# Patient Record
Sex: MALE | Race: WHITE | NOT HISPANIC OR LATINO
[De-identification: names, ages, dates, MRNs, and addresses within clinical notes are randomized per-mention and may not be internally consistent; named-entity substitution may affect disease eponyms.]

---

## 2017-02-23 ENCOUNTER — RX RENEWAL (OUTPATIENT)
Age: 52
End: 2017-02-23

## 2017-03-03 ENCOUNTER — MEDICATION RENEWAL (OUTPATIENT)
Age: 52
End: 2017-03-03

## 2017-03-07 ENCOUNTER — MEDICATION RENEWAL (OUTPATIENT)
Age: 52
End: 2017-03-07

## 2017-04-24 ENCOUNTER — RX RENEWAL (OUTPATIENT)
Age: 52
End: 2017-04-24

## 2017-06-26 ENCOUNTER — RX RENEWAL (OUTPATIENT)
Age: 52
End: 2017-06-26

## 2017-07-05 ENCOUNTER — RX RENEWAL (OUTPATIENT)
Age: 52
End: 2017-07-05

## 2017-07-11 ENCOUNTER — MEDICATION RENEWAL (OUTPATIENT)
Age: 52
End: 2017-07-11

## 2017-07-28 ENCOUNTER — APPOINTMENT (OUTPATIENT)
Dept: ENDOCRINOLOGY | Facility: CLINIC | Age: 52
End: 2017-07-28
Payer: COMMERCIAL

## 2017-07-28 VITALS
OXYGEN SATURATION: 98 % | HEIGHT: 70.5 IN | HEART RATE: 95 BPM | SYSTOLIC BLOOD PRESSURE: 122 MMHG | DIASTOLIC BLOOD PRESSURE: 88 MMHG

## 2017-07-28 PROCEDURE — 99215 OFFICE O/P EST HI 40 MIN: CPT

## 2017-11-06 ENCOUNTER — RX RENEWAL (OUTPATIENT)
Age: 52
End: 2017-11-06

## 2018-01-02 ENCOUNTER — RX RENEWAL (OUTPATIENT)
Age: 53
End: 2018-01-02

## 2018-01-30 ENCOUNTER — APPOINTMENT (OUTPATIENT)
Dept: ENDOCRINOLOGY | Facility: CLINIC | Age: 53
End: 2018-01-30
Payer: COMMERCIAL

## 2018-01-30 VITALS
HEIGHT: 70.5 IN | OXYGEN SATURATION: 97 % | SYSTOLIC BLOOD PRESSURE: 130 MMHG | RESPIRATION RATE: 17 BRPM | DIASTOLIC BLOOD PRESSURE: 80 MMHG | HEART RATE: 107 BPM

## 2018-01-30 LAB — GLUCOSE BLDC GLUCOMTR-MCNC: 236

## 2018-01-30 PROCEDURE — 99215 OFFICE O/P EST HI 40 MIN: CPT | Mod: 25

## 2018-01-30 PROCEDURE — 82962 GLUCOSE BLOOD TEST: CPT

## 2018-03-04 ENCOUNTER — RX RENEWAL (OUTPATIENT)
Age: 53
End: 2018-03-04

## 2018-03-04 ENCOUNTER — MEDICATION RENEWAL (OUTPATIENT)
Age: 53
End: 2018-03-04

## 2018-03-13 ENCOUNTER — MEDICATION RENEWAL (OUTPATIENT)
Age: 53
End: 2018-03-13

## 2018-04-30 ENCOUNTER — APPOINTMENT (OUTPATIENT)
Dept: ENDOCRINOLOGY | Facility: CLINIC | Age: 53
End: 2018-04-30
Payer: COMMERCIAL

## 2018-04-30 VITALS
SYSTOLIC BLOOD PRESSURE: 140 MMHG | HEART RATE: 92 BPM | OXYGEN SATURATION: 99 % | BODY MASS INDEX: 34.09 KG/M2 | WEIGHT: 241 LBS | DIASTOLIC BLOOD PRESSURE: 85 MMHG

## 2018-04-30 VITALS — DIASTOLIC BLOOD PRESSURE: 70 MMHG | SYSTOLIC BLOOD PRESSURE: 140 MMHG

## 2018-04-30 DIAGNOSIS — Z00.00 ENCOUNTER FOR GENERAL ADULT MEDICAL EXAMINATION W/OUT ABNORMAL FINDINGS: ICD-10-CM

## 2018-04-30 LAB
GLUCOSE BLDC GLUCOMTR-MCNC: 115
GLUCOSE BLDC GLUCOMTR-MCNC: 79
HBA1C MFR BLD HPLC: 7.5

## 2018-04-30 PROCEDURE — 99215 OFFICE O/P EST HI 40 MIN: CPT | Mod: 25

## 2018-04-30 PROCEDURE — 82962 GLUCOSE BLOOD TEST: CPT

## 2018-05-04 RX ORDER — INSULIN GLARGINE 100 [IU]/ML
100 INJECTION, SOLUTION SUBCUTANEOUS
Qty: 4 | Refills: 3 | Status: DISCONTINUED | COMMUNITY
Start: 2018-01-30 | End: 2018-05-04

## 2018-07-01 ENCOUNTER — RX RENEWAL (OUTPATIENT)
Age: 53
End: 2018-07-01

## 2018-09-17 ENCOUNTER — APPOINTMENT (OUTPATIENT)
Dept: ENDOCRINOLOGY | Facility: CLINIC | Age: 53
End: 2018-09-17

## 2018-12-03 ENCOUNTER — RX RENEWAL (OUTPATIENT)
Age: 53
End: 2018-12-03

## 2018-12-28 ENCOUNTER — RX RENEWAL (OUTPATIENT)
Age: 53
End: 2018-12-28

## 2019-02-12 ENCOUNTER — RX RENEWAL (OUTPATIENT)
Age: 54
End: 2019-02-12

## 2019-02-26 ENCOUNTER — RX RENEWAL (OUTPATIENT)
Age: 54
End: 2019-02-26

## 2019-05-24 ENCOUNTER — RX RENEWAL (OUTPATIENT)
Age: 54
End: 2019-05-24

## 2019-05-24 ENCOUNTER — MEDICATION RENEWAL (OUTPATIENT)
Age: 54
End: 2019-05-24

## 2019-05-28 ENCOUNTER — APPOINTMENT (OUTPATIENT)
Dept: ENDOCRINOLOGY | Facility: CLINIC | Age: 54
End: 2019-05-28
Payer: COMMERCIAL

## 2019-05-28 VITALS
WEIGHT: 280 LBS | SYSTOLIC BLOOD PRESSURE: 120 MMHG | BODY MASS INDEX: 39.64 KG/M2 | DIASTOLIC BLOOD PRESSURE: 80 MMHG | HEIGHT: 70.5 IN | OXYGEN SATURATION: 96 % | HEART RATE: 84 BPM

## 2019-05-28 PROCEDURE — 99215 OFFICE O/P EST HI 40 MIN: CPT

## 2019-05-28 RX ORDER — METOPROLOL SUCCINATE 50 MG/1
50 TABLET, EXTENDED RELEASE ORAL
Qty: 90 | Refills: 0 | Status: DISCONTINUED | COMMUNITY
Start: 2016-12-30 | End: 2019-05-28

## 2019-05-28 RX ORDER — TRAZODONE HYDROCHLORIDE 50 MG/1
50 TABLET ORAL
Qty: 14 | Refills: 0 | Status: ACTIVE | COMMUNITY
Start: 2019-01-10

## 2019-05-28 RX ORDER — NALTREXONE HYDROCHLORIDE AND BUPROPION HYDROCHLORIDE 8; 90 MG/1; MG/1
8-90 TABLET, EXTENDED RELEASE ORAL
Qty: 120 | Refills: 0 | Status: ACTIVE | COMMUNITY
Start: 2019-01-10

## 2019-05-28 RX ORDER — ZOLPIDEM TARTRATE 12.5 MG/1
12.5 TABLET, EXTENDED RELEASE ORAL
Qty: 30 | Refills: 0 | Status: ACTIVE | COMMUNITY
Start: 2018-10-19

## 2019-05-28 RX ORDER — NEBIVOLOL HYDROCHLORIDE 20 MG/1
20 TABLET ORAL DAILY
Refills: 0 | Status: ACTIVE | COMMUNITY
Start: 2019-05-28

## 2019-05-28 NOTE — DATA REVIEWED
[FreeTextEntry1] : Labs \par 7/24/17\par \par BUN/cr 21/0.73\par calcium 9.3\par microalb/cr 155\par chol 200 HDL 47 LDL 85 tri 340\par iron 70\par TSH 4.16 Free T4 1.4\par H/H 15.1/46.5\par Hba1c 9.5%\par 25 vitamin D 29\par \par 1/26/18\par \par BUN/cr 19/0.9\par calcium 10.1\par microalb/cr ratio 261\par chol 200 HDL 36  tri 388\par iron 75\par TSH 3.49 Free T4 1.2\par CBC normal\par HBa1c 9.7%\par 25 vitamin D 24\par \par 4/27/18\par glucose 162\par BUN/cr 23/0.77\par calcium 9.1\par microalb/cr ratio 404\par chol 178 HDL 40 LDL 97 tri 288\par iron 63\par TSH 3.5 Free T4 1.2\par H/H 13.8/41.7\par vitamin B12 242\par Hba1c 7.4%\par 25 vitamin D 33\par \par 5/24/19\par glucose 149\par BUN/cr 20/0.8\par calcium 9.2\par LFTs normal\par urine microalb/cr 483\par chol 201 HDL 44  tri 364\par iron 58\par ferritin 36\par TSH 3.12 Free T4 1.0\par H/H 13.4/40\par vitamin B12 233\par folate 7.9\par Hba1c 7.5%\par 25 vitamin d 29\par

## 2019-05-28 NOTE — HISTORY OF PRESENT ILLNESS
[___] : [unfilled] [FreeTextEntry1] : 53 y.o. male with h/o Type 2 DM, s/p gastric bypass,  HTN and hyperlipidemia here for follow up visit. Reports weight gain since starting insulin.  Checks FS 1 to 2 times per day.  No hypoglycemia. Taking Victoza 1.8 mg SQ daily, glipizide ER 10 mg daily, Metformin ER 1,000 mg BID and Tresiba 40 units daily. When took Lantus reported swelling and feeling irritable. Did get yeast infections with Invokana and has been off it. Eats small consistent meals and eating more fruits. Eating low carb and more protein. No foot complaints. UTD with optho (1/2019) and no retinopathy. No polyuria. No polydipsia. No SOB or CP. Anemia is stable and had iron infusion 2 years ago. No exercise now. Taking Contrave by PCP for weight loss. C/o fatigue. \par \par Takes vitamin D 100,000 Iu weekly and also takes bariatric vitamin.  [Hypoglycemia] : not hypoglycemic

## 2019-05-28 NOTE — PHYSICAL EXAM
[Alert] : alert [No Acute Distress] : no acute distress [Normal Sclera/Conjunctiva] : normal sclera/conjunctiva [EOMI] : extra ocular movement intact [No Neck Mass] : no neck mass was observed [Supple] : the neck was supple [No LAD] : no lymphadenopathy [No Accessory Muscle Use] : no accessory muscle use [Thyroid Not Enlarged] : the thyroid was not enlarged [Regular Rhythm] : with a regular rhythm [Normal S1, S2] : normal S1 and S2 [Pedal Pulses Normal] : the pedal pulses are present [Normal Bowel Sounds] : normal bowel sounds [Soft] : abdomen soft [Not Tender] : non-tender [Not Distended] : not distended [No Clubbing, Cyanosis] : no clubbing  or cyanosis of the fingernails [No Rash] : no rash [Left Foot Was Examined] : left foot ~C was examined [Right Foot Was Examined] : right foot ~C was examined [Normal] : normal [2+] : 2+ in the dorsalis pedis [Normal Affect] : the affect was normal [Normal Reflexes] : deep tendon reflexes were 2+ and symmetric [Normal Mood] : the mood was normal [Diminished Throughout Both Feet] : normal tactile sensation with monofilament testing throughout both feet [de-identified] : mild edema b/l

## 2019-05-28 NOTE — ASSESSMENT
[Carbohydrate Consistent Diet] : carbohydrate consistent diet [Importance of Diet and Exercise] : importance of diet and exercise to improve glycemic control, achieve weight loss and improve cardiovascular health [Long Term Vascular Complications] : long term vascular complications of diabetes [Incretin Mimetic Therapy] : Risks and benefits of incretin mimetic therapy were discussed with the patient including nauseau, pancreatitis and potential risk of medullary thyroid cancer [FreeTextEntry1] : 53 y.o. male with h/o Type 2 DM, HTN and hyperlipidemia.\par 1. Type 2 DM- Suboptimal control with Hba1c of 7.5%. Encouraged carbohydrate consistent diet and exercise. Will increase Tresiba to 48 units daily. Will continue Victoza 1.8 mg SQ daily. Will continue glipizide ER 10 mg daily and Metformin ER 2,000 mg daily. Encouraged SMBG and forwarding FS log for review.  Urine microalb/cr ratio is stable. Recommend vitamin B12 1,000 mcg daily. \par 2. HTN- BP is at goal and will continue Ramipril 10 mg BID and will continue Bystolic. \par 3. Hyperlipidemia- Triglyceride level is elevated. Encouraged low fat diet. Will increase Rosuvastatin to 20 mg daily. Will continue Fish oil 1,000 mg BID more consistently. \par 4. Vitamin D def- Near normal 25 vitamin d and will continue supplement.\par \par Follow up in 3 months\par

## 2019-05-30 ENCOUNTER — RX RENEWAL (OUTPATIENT)
Age: 54
End: 2019-05-30

## 2019-05-30 RX ORDER — DULAGLUTIDE 0.75 MG/.5ML
0.75 INJECTION, SOLUTION SUBCUTANEOUS
Qty: 1 | Refills: 0 | Status: DISCONTINUED | COMMUNITY
Start: 2019-05-30 | End: 2019-05-30

## 2019-05-30 RX ORDER — LIRAGLUTIDE 6 MG/ML
18 INJECTION SUBCUTANEOUS
Qty: 3 | Refills: 1 | Status: COMPLETED | COMMUNITY
Start: 2017-07-28 | End: 2019-05-30

## 2019-06-26 ENCOUNTER — RX RENEWAL (OUTPATIENT)
Age: 54
End: 2019-06-26

## 2019-08-04 ENCOUNTER — RX RENEWAL (OUTPATIENT)
Age: 54
End: 2019-08-04

## 2019-08-12 ENCOUNTER — MEDICATION RENEWAL (OUTPATIENT)
Age: 54
End: 2019-08-12

## 2019-10-11 ENCOUNTER — APPOINTMENT (OUTPATIENT)
Dept: ENDOCRINOLOGY | Facility: CLINIC | Age: 54
End: 2019-10-11

## 2019-12-23 ENCOUNTER — RX RENEWAL (OUTPATIENT)
Age: 54
End: 2019-12-23

## 2020-01-09 ENCOUNTER — RX RENEWAL (OUTPATIENT)
Age: 55
End: 2020-01-09

## 2020-02-21 ENCOUNTER — RX RENEWAL (OUTPATIENT)
Age: 55
End: 2020-02-21

## 2020-04-07 ENCOUNTER — RX RENEWAL (OUTPATIENT)
Age: 55
End: 2020-04-07

## 2020-04-13 ENCOUNTER — RX RENEWAL (OUTPATIENT)
Age: 55
End: 2020-04-13

## 2020-05-04 ENCOUNTER — RX RENEWAL (OUTPATIENT)
Age: 55
End: 2020-05-04

## 2020-07-06 ENCOUNTER — RX RENEWAL (OUTPATIENT)
Age: 55
End: 2020-07-06

## 2020-09-17 ENCOUNTER — RX RENEWAL (OUTPATIENT)
Age: 55
End: 2020-09-17

## 2020-09-28 ENCOUNTER — RX RENEWAL (OUTPATIENT)
Age: 55
End: 2020-09-28

## 2020-12-21 ENCOUNTER — RX RENEWAL (OUTPATIENT)
Age: 55
End: 2020-12-21

## 2021-01-12 ENCOUNTER — TRANSCRIPTION ENCOUNTER (OUTPATIENT)
Age: 56
End: 2021-01-12

## 2021-02-01 ENCOUNTER — RX RENEWAL (OUTPATIENT)
Age: 56
End: 2021-02-01

## 2021-02-09 ENCOUNTER — RX RENEWAL (OUTPATIENT)
Age: 56
End: 2021-02-09

## 2021-02-09 ENCOUNTER — TRANSCRIPTION ENCOUNTER (OUTPATIENT)
Age: 56
End: 2021-02-09

## 2021-03-15 ENCOUNTER — RX RENEWAL (OUTPATIENT)
Age: 56
End: 2021-03-15

## 2021-03-17 ENCOUNTER — RX RENEWAL (OUTPATIENT)
Age: 56
End: 2021-03-17

## 2021-04-20 ENCOUNTER — RX RENEWAL (OUTPATIENT)
Age: 56
End: 2021-04-20

## 2021-05-10 ENCOUNTER — RX RENEWAL (OUTPATIENT)
Age: 56
End: 2021-05-10

## 2021-05-17 ENCOUNTER — RX RENEWAL (OUTPATIENT)
Age: 56
End: 2021-05-17

## 2021-06-04 ENCOUNTER — APPOINTMENT (OUTPATIENT)
Dept: ENDOCRINOLOGY | Facility: CLINIC | Age: 56
End: 2021-06-04
Payer: COMMERCIAL

## 2021-06-04 VITALS
SYSTOLIC BLOOD PRESSURE: 130 MMHG | TEMPERATURE: 98.3 F | DIASTOLIC BLOOD PRESSURE: 70 MMHG | BODY MASS INDEX: 40.06 KG/M2 | HEIGHT: 70.5 IN | HEART RATE: 70 BPM | OXYGEN SATURATION: 98 % | WEIGHT: 283 LBS

## 2021-06-04 PROCEDURE — 99214 OFFICE O/P EST MOD 30 MIN: CPT

## 2021-06-05 RX ORDER — DULAGLUTIDE 1.5 MG/.5ML
1.5 INJECTION, SOLUTION SUBCUTANEOUS
Qty: 6 | Refills: 0 | Status: DISCONTINUED | COMMUNITY
Start: 2019-05-30 | End: 2021-06-05

## 2021-06-05 RX ORDER — GLIPIZIDE 10 MG/1
10 TABLET ORAL
Qty: 10 | Refills: 0 | Status: DISCONTINUED | COMMUNITY
Start: 2021-02-15

## 2021-06-05 NOTE — ASSESSMENT
[Carbohydrate Consistent Diet] : carbohydrate consistent diet [Long Term Vascular Complications] : long term vascular complications of diabetes [Importance of Diet and Exercise] : importance of diet and exercise to improve glycemic control, achieve weight loss and improve cardiovascular health [Incretin Mimetic Therapy] : Risks and benefits of incretin mimetic therapy were discussed with the patient including nauseau, pancreatitis and potential risk of medullary thyroid cancer [FreeTextEntry1] : 55 y.o. male with h/o Type 2 DM, HTN and hyperlipidemia.\par \par 1. Type 2 DM- Good control with Hba1c of 6.5%  in May 2021. Encouraged carbohydrate consistent diet and exercise. Will decrease Tresiba to 30 units daily. Will increase Trulicity to 3 mg SQ weekly . Will continue glipizide ER 10 mg daily and Metformin ER 2,000 mg daily. Encouraged SMBG and forwarding FS log for review.  Urine microalb/cr ratio is stable in May 2021. Will continue vitamin B12 1,000 mcg daily. \par \par 2. HTN- BP is at goal and will continue Ramipril 10 mg BID and will continue Bystolic. \par \par 3. Hyperlipidemia- Lipids are at goal. Encouraged low fat diet. Will continue Rosuvastatin 20 mg daily. Will continue Fish oil 1,000 mg BID. \par \par 4. Vitamin D def- Normal 25 vitamin D and will continue supplement.\par \par Follow up in 3 months\par Follow up with hematology given low ferritin and iron\par  [Hypoglycemia Management] : hypoglycemia management

## 2021-06-05 NOTE — DATA REVIEWED
[FreeTextEntry1] : Labs \par 7/24/17\par \par BUN/cr 21/0.73\par calcium 9.3\par microalb/cr 155\par chol 200 HDL 47 LDL 85 tri 340\par iron 70\par TSH 4.16 Free T4 1.4\par H/H 15.1/46.5\par Hba1c 9.5%\par 25 vitamin D 29\par \par 1/26/18\par \par BUN/cr 19/0.9\par calcium 10.1\par microalb/cr ratio 261\par chol 200 HDL 36  tri 388\par iron 75\par TSH 3.49 Free T4 1.2\par CBC normal\par HBa1c 9.7%\par 25 vitamin D 24\par \par 4/27/18\par glucose 162\par BUN/cr 23/0.77\par calcium 9.1\par microalb/cr ratio 404\par chol 178 HDL 40 LDL 97 tri 288\par iron 63\par TSH 3.5 Free T4 1.2\par H/H 13.8/41.7\par vitamin B12 242\par Hba1c 7.4%\par 25 vitamin D 33\par \par 5/24/19\par glucose 149\par BUN/cr 20/0.8\par calcium 9.2\par LFTs normal\par urine microalb/cr 483\par chol 201 HDL 44  tri 364\par iron 58\par ferritin 36\par TSH 3.12 Free T4 1.0\par H/H 13.4/40\par vitamin B12 233\par folate 7.9\par Hba1c 7.5%\par 25 vitamin d 29\par \par 5/28/21\par urine microalb/cr ratio 253\par chol 158 HDL 48 LDL 85 tri 148\par glucose 95\par BUN/cr 17/0.85\par Hba1c 6.5%\par TSH 2.70\par H/H 13.1/40.9\par vitamin B12 353\par 25 vitamin D 40\par \par

## 2021-06-05 NOTE — PHYSICAL EXAM
[Alert] : alert [No Acute Distress] : no acute distress [Normal Sclera/Conjunctiva] : normal sclera/conjunctiva [EOMI] : extra ocular movement intact [No LAD] : no lymphadenopathy [Thyroid Not Enlarged] : the thyroid was not enlarged [No Respiratory Distress] : no respiratory distress [Clear to Auscultation] : lungs were clear to auscultation bilaterally [Normal S1, S2] : normal S1 and S2 [Regular Rhythm] : with a regular rhythm [Normal Bowel Sounds] : normal bowel sounds [Not Tender] : non-tender [Not Distended] : not distended [Soft] : abdomen soft [Normal Anterior Cervical Nodes] : no anterior cervical lymphadenopathy [No Clubbing, Cyanosis] : no clubbing  or cyanosis of the fingernails [No Rash] : no rash [Right Foot Was Examined] : right foot ~C was examined [Left Foot Was Examined] : left foot ~C was examined [Normal] : normal [2+] : 2+ in the dorsalis pedis [Diminished Throughout Both Feet] : diminished tactile sensation with monofilament testing throughout both feet [Normal Reflexes] : deep tendon reflexes were 2+ and symmetric [Normal Affect] : the affect was normal [Normal Mood] : the mood was normal [de-identified] : mild edema b/l [FreeTextEntry1] : callus [FreeTextEntry5] : callus

## 2021-06-05 NOTE — HISTORY OF PRESENT ILLNESS
[___] : [unfilled] [FreeTextEntry1] : 55 y.o. male with h/o Type 2 DM, s/p gastric bypass,  HTN and hyperlipidemia here for follow up visit. Suspected COVID-19 infection on 12/31/20 but did get COVID-19 vaccine Moderna X 2. Reports weight gain since starting insulin but stable since last year.  Checks FS 1 to 2 times per day.  No hypoglycemia. Taking Trulicity 1.5 mg SQ weekly, glipizide ER 10 mg daily, Metformin ER 1,000 mg BID and Tresiba 40 units daily. When took Lantus reported swelling and feeling irritable. Did get yeast infections with Invokana and has been off it. Eats small consistent meals and eating more fruits. Eating low carb and more protein. Saw podiatry. No foot complaints. UTD with optho (4/2021) and does have retinopathy. Had Lasik eye surgery. No polyuria. No polydipsia. No SOB or CP. Anemia is stable and had iron infusion 4 years ago. Saw hematology pre pandemic. No exercise now. Taking Contrave by PCP for weight loss. C/o fatigue. Does walking now. \par \par Takes vitamin D 80,000 Iu weekly and also takes MVI and Fish oil 2 BID.  [Hypoglycemia] : not hypoglycemic

## 2021-06-05 NOTE — REVIEW OF SYSTEMS
[All other systems negative] : All other systems negative [Fatigue] : fatigue [Recent Weight Gain (___ Lbs)] : no recent weight gain [Recent Weight Loss (___ Lbs)] : no recent weight loss

## 2021-06-07 ENCOUNTER — RX RENEWAL (OUTPATIENT)
Age: 56
End: 2021-06-07

## 2021-07-22 ENCOUNTER — RX RENEWAL (OUTPATIENT)
Age: 56
End: 2021-07-22

## 2021-08-16 ENCOUNTER — RX RENEWAL (OUTPATIENT)
Age: 56
End: 2021-08-16

## 2021-10-11 ENCOUNTER — APPOINTMENT (OUTPATIENT)
Dept: ENDOCRINOLOGY | Facility: CLINIC | Age: 56
End: 2021-10-11
Payer: COMMERCIAL

## 2021-10-11 VITALS
BODY MASS INDEX: 40.91 KG/M2 | WEIGHT: 289 LBS | SYSTOLIC BLOOD PRESSURE: 152 MMHG | DIASTOLIC BLOOD PRESSURE: 88 MMHG | HEIGHT: 70.5 IN | HEART RATE: 76 BPM | OXYGEN SATURATION: 96 % | RESPIRATION RATE: 18 BRPM

## 2021-10-11 VITALS — SYSTOLIC BLOOD PRESSURE: 140 MMHG | DIASTOLIC BLOOD PRESSURE: 70 MMHG

## 2021-10-11 PROCEDURE — 99214 OFFICE O/P EST MOD 30 MIN: CPT

## 2021-10-11 RX ORDER — PEN NEEDLE, DIABETIC 29 G X1/2"
31G X 5 MM NEEDLE, DISPOSABLE MISCELLANEOUS
Qty: 1 | Refills: 3 | Status: DISCONTINUED | COMMUNITY
Start: 2017-07-28 | End: 2021-10-11

## 2021-10-11 RX ORDER — PEN NEEDLE, DIABETIC 32GX 5/32"
32G X 4 MM NEEDLE, DISPOSABLE MISCELLANEOUS
Qty: 100 | Refills: 3 | Status: ACTIVE | COMMUNITY
Start: 2021-10-11 | End: 1900-01-01

## 2021-10-11 NOTE — ASSESSMENT
[Carbohydrate Consistent Diet] : carbohydrate consistent diet [Long Term Vascular Complications] : long term vascular complications of diabetes [Importance of Diet and Exercise] : importance of diet and exercise to improve glycemic control, achieve weight loss and improve cardiovascular health [Incretin Mimetic Therapy] : Risks and benefits of incretin mimetic therapy were discussed with the patient including nauseau, pancreatitis and potential risk of medullary thyroid cancer [FreeTextEntry1] : 56 y.o. male with h/o Type 2 DM, HTN and hyperlipidemia.\par \par 1. Type 2 DM- Good control with Hba1c of 6.4% this month. Encouraged carbohydrate consistent diet and exercise. Will decrease Tresiba to 30 units daily. Will increase Trulicity to 4.5 mg SQ weekly . Will continue glipizide ER 10 mg daily and Metformin ER 2,000 mg daily. Encouraged SMBG and forwarding FS log for review.  Urine microalb/cr ratio is stable in October 2021. Will continue vitamin B12 1,000 mcg daily. \par \par 2. HTN- BP is above goal and will continue Ramipril 10 mg BID and will continue Bystolic for now. Recommend follow up with cardiology.  \par \par 3. Hyperlipidemia- LDL is at goal. Encouraged low fat diet. Will continue Rosuvastatin 20 mg daily. Will continue Fish oil 1,000 mg BID for hypertriglyceridemia. \par \par 4. Vitamin D def- Normal 25 vitamin D and will continue supplement.\par \par Follow up in 3 months\par Follows with hematology for iron deficiency\par Follow up with PCP for joint symptoms\par

## 2021-10-11 NOTE — HISTORY OF PRESENT ILLNESS
[___] : [unfilled] [FreeTextEntry1] : 56 y.o. male with h/o Type 2 DM, s/p gastric bypass,  HTN and hyperlipidemia here for follow up visit. Suspected COVID-19 infection on 12/31/20 and did get COVID-19 vaccine Moderna X 2. Reports weight gain since starting insulin but stable since last year.  Checks FS 1 to 2 times per day.  No hypoglycemia. Taking Trulicity 3 mg SQ weekly, glipizide ER 10 mg daily, Metformin ER 1,000 mg BID and Tresiba 44 units daily. When took Lantus reported swelling and feeling irritable. Did get yeast infections with Invokana and has been off it. Eats small consistent meals and eating more fruits. Eating low carb and more protein. Reports late night snacking is a problem. Saw podiatry. No foot complaints. UTD with optho (4/2021) and does have retinopathy. Had Lasik eye surgery. No polyuria. No polydipsia. No SOB or CP. Anemia is stable. Saw hematology and received iron infusion and vitamin B12 injection in July 2021. No exercise now but does walking. Taking Contrave by PCP for weight loss. C/o fatigue but sleep is a problem. Reports diffuse joint stiffness which comes on and off. \par \par Takes vitamin D 80,000 Iu weekly and also takes MVI and Fish oil 2 BID.  [Hypoglycemia] : not hypoglycemic

## 2021-10-11 NOTE — REVIEW OF SYSTEMS
[Fatigue] : fatigue [Recent Weight Gain (___ Lbs)] : recent [unfilled] ~Ulb weight gain [Joint Stiffness] : joint stiffness [All other systems negative] : All other systems negative [Recent Weight Loss (___ Lbs)] : no recent weight loss

## 2021-10-11 NOTE — DATA REVIEWED
[FreeTextEntry1] : Labs \par 7/24/17\par \par BUN/cr 21/0.73\par calcium 9.3\par microalb/cr 155\par chol 200 HDL 47 LDL 85 tri 340\par iron 70\par TSH 4.16 Free T4 1.4\par H/H 15.1/46.5\par Hba1c 9.5%\par 25 vitamin D 29\par \par 1/26/18\par \par BUN/cr 19/0.9\par calcium 10.1\par microalb/cr ratio 261\par chol 200 HDL 36  tri 388\par iron 75\par TSH 3.49 Free T4 1.2\par CBC normal\par HBa1c 9.7%\par 25 vitamin D 24\par \par 4/27/18\par glucose 162\par BUN/cr 23/0.77\par calcium 9.1\par microalb/cr ratio 404\par chol 178 HDL 40 LDL 97 tri 288\par iron 63\par TSH 3.5 Free T4 1.2\par H/H 13.8/41.7\par vitamin B12 242\par Hba1c 7.4%\par 25 vitamin D 33\par \par 5/24/19\par glucose 149\par BUN/cr 20/0.8\par calcium 9.2\par LFTs normal\par urine microalb/cr 483\par chol 201 HDL 44  tri 364\par iron 58\par ferritin 36\par TSH 3.12 Free T4 1.0\par H/H 13.4/40\par vitamin B12 233\par folate 7.9\par Hba1c 7.5%\par 25 vitamin d 29\par \par 5/28/21\par urine microalb/cr ratio 253\par chol 158 HDL 48 LDL 85 tri 148\par glucose 95\par BUN/cr 17/0.85\par Hba1c 6.5%\par TSH 2.70\par H/H 13.1/40.9\par vitamin B12 353\par 25 vitamin D 40\par \par 10/6/21\par iron 79\par alb/cr ratio 220\par chol 160 HDL 41 LDL 74 tri 376\par glucose 93\par BUN/cr 20/0.72\par HBa1c 6.4%\par TSH 3.15\par H/H 14.2/42.7\par vitamin B12 >2,000 \par 25 vitamin D 56\par \par

## 2021-10-11 NOTE — PHYSICAL EXAM
[Alert] : alert [No Acute Distress] : no acute distress [Normal Sclera/Conjunctiva] : normal sclera/conjunctiva [EOMI] : extra ocular movement intact [No LAD] : no lymphadenopathy [Thyroid Not Enlarged] : the thyroid was not enlarged [No Respiratory Distress] : no respiratory distress [Clear to Auscultation] : lungs were clear to auscultation bilaterally [Normal S1, S2] : normal S1 and S2 [Regular Rhythm] : with a regular rhythm [Normal Bowel Sounds] : normal bowel sounds [Not Tender] : non-tender [Not Distended] : not distended [Soft] : abdomen soft [Normal Anterior Cervical Nodes] : no anterior cervical lymphadenopathy [No Clubbing, Cyanosis] : no clubbing  or cyanosis of the fingernails [No Rash] : no rash [Normal] : normal [2+] : 2+ in the dorsalis pedis [Diminished Throughout Both Feet] : diminished tactile sensation with monofilament testing throughout both feet [Normal Reflexes] : deep tendon reflexes were 2+ and symmetric [Normal Affect] : the affect was normal [Normal Mood] : the mood was normal [Right foot was examined, including] : right foot ~C was examined, including visual inspection with sensory and pulse exams [Left foot was examined, including] : left foot ~C was examined, including visual inspection with sensory and pulse exams [de-identified] : mild edema b/l [FreeTextEntry1] : callus [FreeTextEntry5] : callus

## 2021-10-25 ENCOUNTER — NON-APPOINTMENT (OUTPATIENT)
Age: 56
End: 2021-10-25

## 2021-10-25 ENCOUNTER — APPOINTMENT (OUTPATIENT)
Dept: CARDIOLOGY | Facility: CLINIC | Age: 56
End: 2021-10-25
Payer: COMMERCIAL

## 2021-10-25 VITALS
DIASTOLIC BLOOD PRESSURE: 74 MMHG | HEIGHT: 70.5 IN | HEART RATE: 78 BPM | RESPIRATION RATE: 96 BRPM | BODY MASS INDEX: 40.77 KG/M2 | TEMPERATURE: 98.2 F | WEIGHT: 288 LBS | SYSTOLIC BLOOD PRESSURE: 145 MMHG

## 2021-10-25 DIAGNOSIS — E11.65 TYPE 2 DIABETES MELLITUS WITH HYPERGLYCEMIA: ICD-10-CM

## 2021-10-25 PROCEDURE — 99204 OFFICE O/P NEW MOD 45 MIN: CPT

## 2021-10-25 PROCEDURE — 93000 ELECTROCARDIOGRAM COMPLETE: CPT

## 2021-10-25 NOTE — DISCUSSION/SUMMARY
[FreeTextEntry1] : 56M with DM HTN HLD anemia presents to establish cardiovascular care\par \par \par Preventive care\par -pt without symptoms of CV disease but with multiple risk factors including age, htn hld, dm elevated BMI\par -will check tte to r/o structural heart disease and will check calcium score for further risk stratification\par \par \par HLD\par -TG elevated\par -d/w pt RE: dietary modification vs increasing statin therapy\par -will try dietary modifications for now\par \par HTN\par -mildly elevated here today\par -states at home bp log much better\par -will check tte to r/o LVH, structural heart disease to see if we should pursue more aggressive bp mgmt\par \par f/u 6 months or sooner if needed

## 2021-10-25 NOTE — PHYSICAL EXAM
[Well Developed] : well developed [Well Nourished] : well nourished [No Acute Distress] : no acute distress [Normal Venous Pressure] : normal venous pressure [Normal S1, S2] : normal S1, S2 [No Murmur] : no murmur [Clear Lung Fields] : clear lung fields [Good Air Entry] : good air entry [No Respiratory Distress] : no respiratory distress  [Soft] : abdomen soft [Non Tender] : non-tender [No Masses/organomegaly] : no masses/organomegaly [No Edema] : no edema [No Rash] : no rash [Moves all extremities] : moves all extremities [No Focal Deficits] : no focal deficits [Alert and Oriented] : alert and oriented

## 2021-10-25 NOTE — HISTORY OF PRESENT ILLNESS
[FreeTextEntry1] : 56M with DM HTN HLD anemia presents to establish cardiovascular care\par Sent in by: endo: Dr Carr. \par PMD: Dr [place] Xuperfecto  [phone] \par \par pt presents for overall evaluation. \par \par pt states he is overall feeling well. denies cp or sob at rest of on exertion. able to walk several blocks without issues. pt denies palpitations, dizziness, syncope, diaphoresis. pt denies LE edema, orthopnea. \par \par \par pt had covid 12/2020, not hospitalized. \par  \par  \par Exercise: walking 10,000 per day.\par Diet: none\par \par Prior cardiac workup: ten years ago, normal per pt. \par Recent labs: 10/21: tot chol 160 tg 376 hdl 41 ldl 74 Cr .72 gfr 102 a1c 6.4\par \par pt checks bp at home occasionally, usually gets 120-130s systolic. \par  \par EKG: SR 74\par \par  \par Med hx: DM HTN HLD iron deficient anemia (2/2 gastric bipass) on iron infusions q2 years. \par Sx hx: bariatric surgery, tonsils. \par Fam hx: none\par Social hx: lives in [place] with wife and 2 kids (twins 13 yo). 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 no tob. minimal etoh. no drugs. \par Meds: nebivolol 20 ramipril 10 crestor glipizide metformin trulicity trazodone prn ambien prn insulin\par Allergies: nkda\par \par

## 2021-11-16 ENCOUNTER — APPOINTMENT (OUTPATIENT)
Dept: CARDIOLOGY | Facility: CLINIC | Age: 56
End: 2021-11-16

## 2021-12-14 ENCOUNTER — RX RENEWAL (OUTPATIENT)
Age: 56
End: 2021-12-14

## 2022-01-18 ENCOUNTER — APPOINTMENT (OUTPATIENT)
Dept: ENDOCRINOLOGY | Facility: CLINIC | Age: 57
End: 2022-01-18

## 2022-02-22 ENCOUNTER — RX RENEWAL (OUTPATIENT)
Age: 57
End: 2022-02-22

## 2022-03-21 ENCOUNTER — APPOINTMENT (OUTPATIENT)
Dept: CARDIOLOGY | Facility: CLINIC | Age: 57
End: 2022-03-21
Payer: COMMERCIAL

## 2022-03-21 PROCEDURE — 93306 TTE W/DOPPLER COMPLETE: CPT

## 2022-04-01 ENCOUNTER — APPOINTMENT (OUTPATIENT)
Dept: CT IMAGING | Facility: CLINIC | Age: 57
End: 2022-04-01
Payer: SELF-PAY

## 2022-04-01 ENCOUNTER — OUTPATIENT (OUTPATIENT)
Dept: OUTPATIENT SERVICES | Facility: HOSPITAL | Age: 57
LOS: 1 days | End: 2022-04-01
Payer: SELF-PAY

## 2022-04-01 DIAGNOSIS — E11.9 TYPE 2 DIABETES MELLITUS WITHOUT COMPLICATIONS: ICD-10-CM

## 2022-04-01 DIAGNOSIS — E11.65 TYPE 2 DIABETES MELLITUS WITH HYPERGLYCEMIA: ICD-10-CM

## 2022-04-01 PROCEDURE — 75571 CT HRT W/O DYE W/CA TEST: CPT | Mod: 26

## 2022-04-01 PROCEDURE — 75571 CT HRT W/O DYE W/CA TEST: CPT

## 2022-08-04 ENCOUNTER — RX RENEWAL (OUTPATIENT)
Age: 57
End: 2022-08-04

## 2022-08-10 ENCOUNTER — RX RENEWAL (OUTPATIENT)
Age: 57
End: 2022-08-10

## 2022-09-09 ENCOUNTER — APPOINTMENT (OUTPATIENT)
Dept: DERMATOLOGY | Facility: CLINIC | Age: 57
End: 2022-09-09

## 2022-10-17 ENCOUNTER — RX RENEWAL (OUTPATIENT)
Age: 57
End: 2022-10-17

## 2022-10-27 NOTE — HISTORY REVIEWED
[History reviewed] : History reviewed. [Medications and Allergies reviewed] : Medications and allergies reviewed. Spiral Flap Text: The defect edges were debeveled with a #15 scalpel blade.  Given the location of the defect, shape of the defect and the proximity to free margins a spiral flap was deemed most appropriate.  Using a sterile surgical marker, an appropriate rotation flap was drawn incorporating the defect and placing the expected incisions within the relaxed skin tension lines where possible. The area thus outlined was incised deep to adipose tissue with a #15 scalpel blade.  The skin margins were undermined to an appropriate distance in all directions utilizing iris scissors.

## 2022-11-01 ENCOUNTER — APPOINTMENT (OUTPATIENT)
Dept: ENDOCRINOLOGY | Facility: CLINIC | Age: 57
End: 2022-11-01

## 2022-12-07 ENCOUNTER — RX RENEWAL (OUTPATIENT)
Age: 57
End: 2022-12-07

## 2023-01-16 ENCOUNTER — RX RENEWAL (OUTPATIENT)
Age: 58
End: 2023-01-16

## 2023-03-07 ENCOUNTER — RX RENEWAL (OUTPATIENT)
Age: 58
End: 2023-03-07

## 2023-06-05 ENCOUNTER — RX RENEWAL (OUTPATIENT)
Age: 58
End: 2023-06-05

## 2023-08-07 ENCOUNTER — RX RENEWAL (OUTPATIENT)
Age: 58
End: 2023-08-07

## 2023-08-21 ENCOUNTER — RX RENEWAL (OUTPATIENT)
Age: 58
End: 2023-08-21

## 2023-09-04 ENCOUNTER — RX RENEWAL (OUTPATIENT)
Age: 58
End: 2023-09-04

## 2023-09-04 RX ORDER — INSULIN DEGLUDEC INJECTION 200 U/ML
200 INJECTION, SOLUTION SUBCUTANEOUS
Qty: 27 | Refills: 3 | Status: ACTIVE | COMMUNITY
Start: 2018-05-04 | End: 1900-01-01

## 2023-10-03 ENCOUNTER — RX RENEWAL (OUTPATIENT)
Age: 58
End: 2023-10-03

## 2023-10-24 ENCOUNTER — RX RENEWAL (OUTPATIENT)
Age: 58
End: 2023-10-24

## 2023-11-03 ENCOUNTER — RX RENEWAL (OUTPATIENT)
Age: 58
End: 2023-11-03

## 2023-11-06 ENCOUNTER — APPOINTMENT (OUTPATIENT)
Dept: ENDOCRINOLOGY | Facility: CLINIC | Age: 58
End: 2023-11-06
Payer: COMMERCIAL

## 2023-11-06 VITALS
TEMPERATURE: 98.3 F | BODY MASS INDEX: 40.35 KG/M2 | WEIGHT: 285 LBS | HEART RATE: 75 BPM | HEIGHT: 70.5 IN | DIASTOLIC BLOOD PRESSURE: 76 MMHG | SYSTOLIC BLOOD PRESSURE: 136 MMHG | OXYGEN SATURATION: 97 %

## 2023-11-06 PROCEDURE — 99215 OFFICE O/P EST HI 40 MIN: CPT | Mod: 25

## 2023-11-06 RX ORDER — HYDROCHLOROTHIAZIDE 25 MG/1
25 TABLET ORAL DAILY
Qty: 90 | Refills: 3 | Status: ACTIVE | COMMUNITY
Start: 2023-11-06

## 2023-11-20 ENCOUNTER — RX RENEWAL (OUTPATIENT)
Age: 58
End: 2023-11-20

## 2024-01-01 ENCOUNTER — RX RENEWAL (OUTPATIENT)
Age: 59
End: 2024-01-01

## 2024-03-05 ENCOUNTER — APPOINTMENT (OUTPATIENT)
Dept: ENDOCRINOLOGY | Facility: CLINIC | Age: 59
End: 2024-03-05
Payer: COMMERCIAL

## 2024-03-05 VITALS
HEART RATE: 83 BPM | OXYGEN SATURATION: 98 % | WEIGHT: 284 LBS | SYSTOLIC BLOOD PRESSURE: 132 MMHG | HEIGHT: 70.5 IN | BODY MASS INDEX: 40.2 KG/M2 | DIASTOLIC BLOOD PRESSURE: 84 MMHG

## 2024-03-05 DIAGNOSIS — E78.5 HYPERLIPIDEMIA, UNSPECIFIED: ICD-10-CM

## 2024-03-05 DIAGNOSIS — I10 ESSENTIAL (PRIMARY) HYPERTENSION: ICD-10-CM

## 2024-03-05 DIAGNOSIS — E55.9 VITAMIN D DEFICIENCY, UNSPECIFIED: ICD-10-CM

## 2024-03-05 DIAGNOSIS — E11.9 TYPE 2 DIABETES MELLITUS W/OUT COMPLICATIONS: ICD-10-CM

## 2024-03-05 DIAGNOSIS — E66.01 MORBID (SEVERE) OBESITY DUE TO EXCESS CALORIES: ICD-10-CM

## 2024-03-05 DIAGNOSIS — E53.8 DEFICIENCY OF OTHER SPECIFIED B GROUP VITAMINS: ICD-10-CM

## 2024-03-05 PROCEDURE — 99214 OFFICE O/P EST MOD 30 MIN: CPT

## 2024-03-05 RX ORDER — DULAGLUTIDE 4.5 MG/.5ML
4.5 INJECTION, SOLUTION SUBCUTANEOUS
Qty: 6 | Refills: 0 | Status: DISCONTINUED | COMMUNITY
Start: 2021-06-05 | End: 2024-03-05

## 2024-03-05 NOTE — ASSESSMENT
[Long Term Vascular Complications] : long term vascular complications of diabetes [Carbohydrate Consistent Diet] : carbohydrate consistent diet [Incretin Mimetic Therapy] : Risks and benefits of incretin mimetic therapy were discussed with the patient including nauseau, pancreatitis and potential risk of medullary thyroid cancer [Importance of Diet and Exercise] : importance of diet and exercise to improve glycemic control, achieve weight loss and improve cardiovascular health [FreeTextEntry1] : 58 y.o. male with h/o Type 2 DM with morbid obesity, HTN, hyperlipidemia and vitamin D def.  1. Type 2 DM with morbid obesity- Good control with Hba1c of 6% in March 2024. Encouraged a carbohydrate consistent diet and exercise. Will continue Tresiba 58 units daily. Will continue Mounjaro 15 mg SQ weekly. Reviewed risks and benefits of GLP-1 agonist including GI side effects, pancreatitis and MTC. Will discontinue glipizide ER 10 mg daily for now. Will continue Metformin ER 2,000 mg daily. Encouraged SMBG and forwarding FS log for review. Urine alb/cr ratio is stable in October 2023 and will recheck today. Recommend vitamin B12 1,000 mcg daily.  2. HTN- BP is at goal and will continue Ramipril 10 mg BID and Bystolic 20 mg daily for now. Recommend follow up with cardiology. Will use HCTZ prn for LE edema.   3. Hyperlipidemia- Will check lipids. Encouraged low fat diet. Will continue Rosuvastatin 20 mg daily. Will continue Fish oil 1,000 mg BID for hypertriglyceridemia.  4. Vitamin D def- Normal 25 vitamin D and will continue supplement.    Follow up in 4 to 6 months.  .

## 2024-03-05 NOTE — PHYSICAL EXAM
[Alert] : alert [No Acute Distress] : no acute distress [Normal Sclera/Conjunctiva] : normal sclera/conjunctiva [EOMI] : extra ocular movement intact [Thyroid Not Enlarged] : the thyroid was not enlarged [No LAD] : no lymphadenopathy [No Respiratory Distress] : no respiratory distress [Clear to Auscultation] : lungs were clear to auscultation bilaterally [Normal S1, S2] : normal S1 and S2 [Regular Rhythm] : with a regular rhythm [Normal Bowel Sounds] : normal bowel sounds [Not Tender] : non-tender [Not Distended] : not distended [Soft] : abdomen soft [No Clubbing, Cyanosis] : no clubbing  or cyanosis of the fingernails [Normal Anterior Cervical Nodes] : no anterior cervical lymphadenopathy [Left foot was examined, including] : left foot ~C was examined, including visual inspection with sensory and pulse exams [Right foot was examined, including] : right foot ~C was examined, including visual inspection with sensory and pulse exams [No Rash] : no rash [Normal] : normal [2+] : 2+ in the dorsalis pedis [Diminished Throughout Both Feet] : diminished tactile sensation with monofilament testing throughout both feet [Normal Reflexes] : deep tendon reflexes were 2+ and symmetric [Normal Mood] : the mood was normal [Normal Affect] : the affect was normal [de-identified] : mild edema b/l [FreeTextEntry1] : callus [FreeTextEntry5] : callus

## 2024-03-05 NOTE — REVIEW OF SYSTEMS
[Fatigue] : fatigue [Joint Stiffness] : joint stiffness [All other systems negative] : All other systems negative [Recent Weight Loss (___ Lbs)] : recent [unfilled] ~Ulb weight loss [Recent Weight Gain (___ Lbs)] : no recent weight gain [Swelling] : swelling

## 2024-03-05 NOTE — DATA REVIEWED
[FreeTextEntry1] : Labs  7/24/17  BUN/cr 21/0.73 calcium 9.3 microalb/cr 155 chol 200 HDL 47 LDL 85 tri 340 iron 70 TSH 4.16 Free T4 1.4 H/H 15.1/46.5 Hba1c 9.5% 25 vitamin D 29  1/26/18  BUN/cr 19/0.9 calcium 10.1 microalb/cr ratio 261 chol 200 HDL 36  tri 388 iron 75 TSH 3.49 Free T4 1.2 CBC normal HBa1c 9.7% 25 vitamin D 24  4/27/18 glucose 162 BUN/cr 23/0.77 calcium 9.1 microalb/cr ratio 404 chol 178 HDL 40 LDL 97 tri 288 iron 63 TSH 3.5 Free T4 1.2 H/H 13.8/41.7 vitamin B12 242 Hba1c 7.4% 25 vitamin D 33  5/24/19 glucose 149 BUN/cr 20/0.8 calcium 9.2 LFTs normal urine microalb/cr 483 chol 201 HDL 44  tri 364 iron 58 ferritin 36 TSH 3.12 Free T4 1.0 H/H 13.4/40 vitamin B12 233 folate 7.9 Hba1c 7.5% 25 vitamin d 29  5/28/21 urine microalb/cr ratio 253 chol 158 HDL 48 LDL 85 tri 148 glucose 95 BUN/cr 17/0.85 Hba1c 6.5% TSH 2.70 H/H 13.1/40.9 vitamin B12 353 25 vitamin D 40  10/6/21 iron 79 alb/cr ratio 220 chol 160 HDL 41 LDL 74 tri 376 glucose 93 BUN/cr 20/0.72 HBa1c 6.4% TSH 3.15 H/H 14.2/42.7 vitamin B12 >2,000  25 vitamin D 56  10/30/23 urine alb/cr 169 Hba1c 6.8% TSH 2.74 iron 101 25 vitamin D 43 chol 137 HDL 48 LDL 68 tri 127 glucose 122 BUN/cr 22/.87 calcium 9.1 H/H 12.7/40.5 ferritin 74 vitamin B12 275 folate 17.9  3/1/24 Hba1c 6% glucose 92 BUN/cr 17/.88 calcium 9.2 25 vitamin D 65 vitamin B12 372 folate 20

## 2024-03-06 LAB
BASOPHILS # BLD AUTO: 0.08 K/UL
BASOPHILS NFR BLD AUTO: 0.9 %
CHOLEST SERPL-MCNC: 151 MG/DL
CREAT SPEC-SCNC: 126 MG/DL
EOSINOPHIL # BLD AUTO: 0.18 K/UL
EOSINOPHIL NFR BLD AUTO: 2.1 %
FERRITIN SERPL-MCNC: 83 NG/ML
HCT VFR BLD CALC: 43.6 %
HDLC SERPL-MCNC: 50 MG/DL
HGB BLD-MCNC: 13.7 G/DL
IMM GRANULOCYTES NFR BLD AUTO: 0.3 %
IRON SATN MFR SERPL: 26 %
IRON SERPL-MCNC: 81 UG/DL
LDLC SERPL CALC-MCNC: 73 MG/DL
LYMPHOCYTES # BLD AUTO: 2.42 K/UL
LYMPHOCYTES NFR BLD AUTO: 27.7 %
MAN DIFF?: NORMAL
MCHC RBC-ENTMCNC: 28.9 PG
MCHC RBC-ENTMCNC: 31.4 GM/DL
MCV RBC AUTO: 92 FL
MICROALBUMIN 24H UR DL<=1MG/L-MCNC: 39.8 MG/DL
MICROALBUMIN/CREAT 24H UR-RTO: 316 MG/G
MONOCYTES # BLD AUTO: 0.64 K/UL
MONOCYTES NFR BLD AUTO: 7.3 %
NEUTROPHILS # BLD AUTO: 5.4 K/UL
NEUTROPHILS NFR BLD AUTO: 61.7 %
NONHDLC SERPL-MCNC: 101 MG/DL
PLATELET # BLD AUTO: 281 K/UL
RBC # BLD: 4.74 M/UL
RBC # FLD: 13.6 %
T4 FREE SERPL-MCNC: 1.3 NG/DL
TIBC SERPL-MCNC: 308 UG/DL
TRIGL SERPL-MCNC: 163 MG/DL
TSH SERPL-ACNC: 2.73 UIU/ML
UIBC SERPL-MCNC: 228 UG/DL
WBC # FLD AUTO: 8.75 K/UL

## 2024-03-15 RX ORDER — TIRZEPATIDE 12.5 MG/.5ML
12.5 INJECTION, SOLUTION SUBCUTANEOUS
Qty: 1 | Refills: 5 | Status: ACTIVE | COMMUNITY
Start: 2024-03-15 | End: 1900-01-01

## 2024-03-26 RX ORDER — SEMAGLUTIDE 2.68 MG/ML
8 INJECTION, SOLUTION SUBCUTANEOUS
Qty: 1 | Refills: 3 | Status: ACTIVE | COMMUNITY
Start: 2024-03-26 | End: 1900-01-01

## 2024-05-20 ENCOUNTER — RX RENEWAL (OUTPATIENT)
Age: 59
End: 2024-05-20

## 2024-05-20 RX ORDER — TIRZEPATIDE 15 MG/.5ML
15 INJECTION, SOLUTION SUBCUTANEOUS
Qty: 1 | Refills: 5 | Status: ACTIVE | COMMUNITY
Start: 2023-11-06 | End: 1900-01-01

## 2024-08-28 ENCOUNTER — RX RENEWAL (OUTPATIENT)
Age: 59
End: 2024-08-28

## 2024-08-31 LAB
25(OH)D3 SERPL-MCNC: 54.1 NG/ML
ALBUMIN SERPL ELPH-MCNC: 4 G/DL
ALP BLD-CCNC: 67 U/L
ALT SERPL-CCNC: 29 U/L
ANION GAP SERPL CALC-SCNC: 12 MMOL/L
AST SERPL-CCNC: 26 U/L
BASOPHILS # BLD AUTO: 0.08 K/UL
BASOPHILS NFR BLD AUTO: 0.9 %
BILIRUB SERPL-MCNC: 0.4 MG/DL
BUN SERPL-MCNC: 19 MG/DL
CALCIUM SERPL-MCNC: 9.3 MG/DL
CHLORIDE SERPL-SCNC: 109 MMOL/L
CHOLEST SERPL-MCNC: 133 MG/DL
CO2 SERPL-SCNC: 25 MMOL/L
CREAT SERPL-MCNC: 0.96 MG/DL
CREAT SPEC-SCNC: 81 MG/DL
EGFR: 90 ML/MIN/1.73M2
EOSINOPHIL # BLD AUTO: 0.15 K/UL
EOSINOPHIL NFR BLD AUTO: 1.7 %
ESTIMATED AVERAGE GLUCOSE: 123 MG/DL
FERRITIN SERPL-MCNC: 69 NG/ML
FOLATE SERPL-MCNC: 15.2 NG/ML
GLUCOSE SERPL-MCNC: 75 MG/DL
HBA1C MFR BLD HPLC: 5.9 %
HCT VFR BLD CALC: 40.9 %
HDLC SERPL-MCNC: 48 MG/DL
HGB BLD-MCNC: 12.7 G/DL
IMM GRANULOCYTES NFR BLD AUTO: 0.3 %
IRON SATN MFR SERPL: 21 %
IRON SERPL-MCNC: 59 UG/DL
LDLC SERPL CALC-MCNC: 61 MG/DL
LYMPHOCYTES # BLD AUTO: 2.4 K/UL
LYMPHOCYTES NFR BLD AUTO: 26.8 %
MAN DIFF?: NORMAL
MCHC RBC-ENTMCNC: 29.7 PG
MCHC RBC-ENTMCNC: 31.1 GM/DL
MCV RBC AUTO: 95.6 FL
MICROALBUMIN 24H UR DL<=1MG/L-MCNC: 19.1 MG/DL
MICROALBUMIN/CREAT 24H UR-RTO: 236 MG/G
MONOCYTES # BLD AUTO: 0.71 K/UL
MONOCYTES NFR BLD AUTO: 7.9 %
NEUTROPHILS # BLD AUTO: 5.58 K/UL
NEUTROPHILS NFR BLD AUTO: 62.4 %
NONHDLC SERPL-MCNC: 85 MG/DL
PLATELET # BLD AUTO: 246 K/UL
POTASSIUM SERPL-SCNC: 4.9 MMOL/L
PROT SERPL-MCNC: 6.4 G/DL
RBC # BLD: 4.28 M/UL
RBC # FLD: 13.2 %
SODIUM SERPL-SCNC: 146 MMOL/L
TIBC SERPL-MCNC: 285 UG/DL
TRIGL SERPL-MCNC: 132 MG/DL
TSH SERPL-ACNC: 2.81 UIU/ML
UIBC SERPL-MCNC: 225 UG/DL
VIT B12 SERPL-MCNC: 1121 PG/ML
WBC # FLD AUTO: 8.95 K/UL

## 2024-09-04 ENCOUNTER — TRANSCRIPTION ENCOUNTER (OUTPATIENT)
Age: 59
End: 2024-09-04

## 2024-09-04 ENCOUNTER — APPOINTMENT (OUTPATIENT)
Dept: ENDOCRINOLOGY | Facility: CLINIC | Age: 59
End: 2024-09-04
Payer: COMMERCIAL

## 2024-09-04 VITALS
OXYGEN SATURATION: 99 % | HEIGHT: 70 IN | BODY MASS INDEX: 39.8 KG/M2 | DIASTOLIC BLOOD PRESSURE: 85 MMHG | WEIGHT: 278 LBS | SYSTOLIC BLOOD PRESSURE: 139 MMHG | HEART RATE: 84 BPM

## 2024-09-04 VITALS — SYSTOLIC BLOOD PRESSURE: 130 MMHG | DIASTOLIC BLOOD PRESSURE: 70 MMHG

## 2024-09-04 DIAGNOSIS — E55.9 VITAMIN D DEFICIENCY, UNSPECIFIED: ICD-10-CM

## 2024-09-04 DIAGNOSIS — E78.5 HYPERLIPIDEMIA, UNSPECIFIED: ICD-10-CM

## 2024-09-04 DIAGNOSIS — E11.9 TYPE 2 DIABETES MELLITUS W/OUT COMPLICATIONS: ICD-10-CM

## 2024-09-04 DIAGNOSIS — E66.01 MORBID (SEVERE) OBESITY DUE TO EXCESS CALORIES: ICD-10-CM

## 2024-09-04 DIAGNOSIS — E53.8 DEFICIENCY OF OTHER SPECIFIED B GROUP VITAMINS: ICD-10-CM

## 2024-09-04 DIAGNOSIS — I10 ESSENTIAL (PRIMARY) HYPERTENSION: ICD-10-CM

## 2024-09-04 PROCEDURE — 99215 OFFICE O/P EST HI 40 MIN: CPT

## 2024-09-04 NOTE — HISTORY OF PRESENT ILLNESS
[___] : [unfilled] [FreeTextEntry1] : 59 y.o. male with h/o Type 2 DM, s/p gastric bypass, HTN and hyperlipidemia here for follow up visit.   No acute events since the last visit.   Checks FS 1 to 2 times per day.  No hypoglycemia. Taking Mounjaro 15 mg SQ weekly (but was using 10 mg and 12.5 mg dose given shortage), glipizide ER 10 mg daily (restarted when Mounjaro dose was decreased), Metformin ER 1,000 mg BID and Tresiba 60 units daily in the am. When took Lantus reported swelling and feeling irritable. Did get yeast infections with Invokana and has been off it.   Eats small consistent meals. Eating low carb and more protein. Reports less late-night snacking.   Eats canned vegetables and hard-boiled eggs.    Saw podiatry. No foot complaints. UTD with optho (2024) and does have retinopathy. Had Lasik eye surgery. Does have proteinuria. No polyuria. No polydipsia. No SOB or CP. Anemia is stable. Saw hematology and received iron infusion and vitamin B12 injection in July 2021. No exercise now but does walking. Taking Contrave on and off prescribed by his PCP for weight loss. C/o fatigue but sleep is a problem. Reports diffuse joint stiffness which comes on and off.    Takes vitamin D 50,000 Iu weekly and also takes MVI and Fish oil 2 BID.   He reports food insensitivity. Hs to limit acidic foods and tries to limit dairy.  [Hypoglycemia] : not hypoglycemic

## 2024-09-04 NOTE — PHYSICAL EXAM
[Alert] : alert [No Acute Distress] : no acute distress [Normal Sclera/Conjunctiva] : normal sclera/conjunctiva [EOMI] : extra ocular movement intact [No LAD] : no lymphadenopathy [Thyroid Not Enlarged] : the thyroid was not enlarged [No Respiratory Distress] : no respiratory distress [Clear to Auscultation] : lungs were clear to auscultation bilaterally [Normal S1, S2] : normal S1 and S2 [Regular Rhythm] : with a regular rhythm [Normal Bowel Sounds] : normal bowel sounds [Not Tender] : non-tender [Not Distended] : not distended [Soft] : abdomen soft [Normal Anterior Cervical Nodes] : no anterior cervical lymphadenopathy [No Clubbing, Cyanosis] : no clubbing  or cyanosis of the fingernails [No Rash] : no rash [Right foot was examined, including] : right foot ~C was examined, including visual inspection with sensory and pulse exams [Left foot was examined, including] : left foot ~C was examined, including visual inspection with sensory and pulse exams [Normal] : normal [2+] : 2+ in the dorsalis pedis [Diminished Throughout Both Feet] : diminished tactile sensation with monofilament testing throughout both feet [Normal Reflexes] : deep tendon reflexes were 2+ and symmetric [Normal Affect] : the affect was normal [Normal Mood] : the mood was normal [de-identified] : mild edema b/l [de-identified] : right periumbilical prominence [FreeTextEntry1] : callus [FreeTextEntry5] : callus

## 2024-09-04 NOTE — PHYSICAL EXAM
[Alert] : alert [No Acute Distress] : no acute distress [Normal Sclera/Conjunctiva] : normal sclera/conjunctiva [EOMI] : extra ocular movement intact [No LAD] : no lymphadenopathy [Thyroid Not Enlarged] : the thyroid was not enlarged [No Respiratory Distress] : no respiratory distress [Clear to Auscultation] : lungs were clear to auscultation bilaterally [Normal S1, S2] : normal S1 and S2 [Regular Rhythm] : with a regular rhythm [Normal Bowel Sounds] : normal bowel sounds [Not Tender] : non-tender [Not Distended] : not distended [Soft] : abdomen soft [Normal Anterior Cervical Nodes] : no anterior cervical lymphadenopathy [No Clubbing, Cyanosis] : no clubbing  or cyanosis of the fingernails [No Rash] : no rash [Right foot was examined, including] : right foot ~C was examined, including visual inspection with sensory and pulse exams [Left foot was examined, including] : left foot ~C was examined, including visual inspection with sensory and pulse exams [Normal] : normal [2+] : 2+ in the dorsalis pedis [Diminished Throughout Both Feet] : diminished tactile sensation with monofilament testing throughout both feet [Normal Reflexes] : deep tendon reflexes were 2+ and symmetric [Normal Affect] : the affect was normal [Normal Mood] : the mood was normal [de-identified] : mild edema b/l [de-identified] : right periumbilical prominence [FreeTextEntry1] : callus [FreeTextEntry5] : callus

## 2024-09-04 NOTE — REVIEW OF SYSTEMS
[Fatigue] : fatigue [Recent Weight Loss (___ Lbs)] : recent [unfilled] ~Ulb weight loss [Joint Stiffness] : joint stiffness [Swelling] : swelling [All other systems negative] : All other systems negative [Recent Weight Gain (___ Lbs)] : no recent weight gain

## 2024-09-04 NOTE — ASSESSMENT
[Carbohydrate Consistent Diet] : carbohydrate consistent diet [Long Term Vascular Complications] : long term vascular complications of diabetes [Importance of Diet and Exercise] : importance of diet and exercise to improve glycemic control, achieve weight loss and improve cardiovascular health [Incretin Mimetic Therapy] : Risks and benefits of incretin mimetic therapy were discussed with the patient including nauseau, pancreatitis and potential risk of medullary thyroid cancer [FreeTextEntry1] : 59 y.o. male with h/o Type 2 DM with morbid obesity, HTN, hyperlipidemia and vitamin D def.  1. Type 2 DM with morbid obesity- Good control with Hba1c of 5.9% in August 2024. Encouraged a carbohydrate consistent diet and exercise. Will continue Tresiba 60 units daily. Will continue Mounjaro 15 mg SQ weekly. Reviewed risks and benefits of GLP-1 agonist including GI side effects, pancreatitis and MTC. Will discontinue glipizide ER 10 mg daily for now. Will continue Metformin ER 2,000 mg daily. Encouraged SMBG and forwarding FS log for review. He is not interested in CGMS. Urine alb/cr ratio is stable in August 2024. Will continue vitamin B12 1,000 mcg daily.  2. HTN- BP is at goal and will continue Ramipril 10 mg BID and Bystolic 20 mg daily for now. Recommend follow up with cardiology. Will use HCTZ prn for LE edema.   3. Hyperlipidemia- LDL is at goal. Encouraged low fat diet. Will continue Rosuvastatin 20 mg daily. Will continue Fish oil 1,000 mg BID for hypertriglyceridemia.  4. Vitamin D def- Normal 25 vitamin D and will continue supplement.    Follow up in 4 to 6 months.  Follow up with PCP for possible ventral hernia Follow up with hand specialist for right hand contracture .

## 2024-09-18 ENCOUNTER — NON-APPOINTMENT (OUTPATIENT)
Age: 59
End: 2024-09-18

## 2024-09-18 ENCOUNTER — APPOINTMENT (OUTPATIENT)
Dept: CARDIOLOGY | Facility: CLINIC | Age: 59
End: 2024-09-18
Payer: COMMERCIAL

## 2024-09-18 VITALS
BODY MASS INDEX: 39.51 KG/M2 | SYSTOLIC BLOOD PRESSURE: 120 MMHG | WEIGHT: 276 LBS | HEIGHT: 70 IN | TEMPERATURE: 98.3 F | DIASTOLIC BLOOD PRESSURE: 72 MMHG | HEART RATE: 76 BPM | OXYGEN SATURATION: 99 %

## 2024-09-18 DIAGNOSIS — I10 ESSENTIAL (PRIMARY) HYPERTENSION: ICD-10-CM

## 2024-09-18 DIAGNOSIS — E66.01 MORBID (SEVERE) OBESITY DUE TO EXCESS CALORIES: ICD-10-CM

## 2024-09-18 DIAGNOSIS — E11.9 TYPE 2 DIABETES MELLITUS W/OUT COMPLICATIONS: ICD-10-CM

## 2024-09-18 DIAGNOSIS — E78.5 HYPERLIPIDEMIA, UNSPECIFIED: ICD-10-CM

## 2024-09-18 PROCEDURE — G2211 COMPLEX E/M VISIT ADD ON: CPT | Mod: NC

## 2024-09-18 PROCEDURE — 99215 OFFICE O/P EST HI 40 MIN: CPT | Mod: 25

## 2024-09-18 PROCEDURE — 93000 ELECTROCARDIOGRAM COMPLETE: CPT

## 2024-09-18 NOTE — HISTORY OF PRESENT ILLNESS
[FreeTextEntry1] : 59M with DM HTN HLD anemia presents for f/u Sent in by: endo: Dr Carr. PMD: Dr [place] [facility]   previously, pt last seen 10/21 for an initial eval, feeling well. calcium score 109. tte grossly unremarkable.  pt has not been seen here since 10/2021 pt now presents for follow up today,  pt states he is overall feeling well. denies cp or sob at rest of on exertion. tries to get 10,000 steps daily pt denies palpitations, dizziness, syncope, diaphoresis. pt denies LE edema, orthopnea.   Exercise: walking 10,000 steps per day. Diet: none   Prior cardiac workup: ten years ago, normal per pt. Recent labs: 10/21: tot chol 160 tg 376 hdl 41 ldl 74 Cr .72 gfr 102 a1c 6.4  EKG: SR 80  Med hx: DM HTN HLD iron deficient anemia (2/2 gastric bipass) on iron infusions q2 years. Sx hx: bariatric surgery, tonsils. Fam hx: none Social hx: lives in [place] with wife and 2 kids (twins 15 yo). 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 no tob. minimal etoh. no drugs. Meds: nebivolol 20 ramipril 10 crestor glipizide metformin mounjaro trazodone prn ambien prn insulin Allergies: nkda

## 2024-09-18 NOTE — REVIEW OF SYSTEMS
[Fever] : no fever [Headache] : no headache [Weight Gain (___ Lbs)] : no recent weight gain [Chills] : no chills [Feeling Fatigued] : not feeling fatigued [Weight Loss (___ Lbs)] : no recent weight loss [Sore Throat] : no sore throat [SOB] : no shortness of breath [Dyspnea on exertion] : not dyspnea during exertion [Chest Discomfort] : no chest discomfort [Lower Ext Edema] : no extremity edema [Palpitations] : no palpitations [Orthopnea] : no orthopnea [Syncope] : no syncope [Cough] : no cough [Wheezing] : no wheezing [Nausea] : no nausea [Vomiting] : no vomiting [Dizziness] : no dizziness [Confusion] : no confusion was observed [Easy Bleeding] : no tendency for easy bleeding [Easy Bruising] : no tendency for easy bruising

## 2024-09-18 NOTE — DISCUSSION/SUMMARY
[FreeTextEntry1] : 59M with DM HTN HLD anemia presents for f/u  Feeling well Euvolemic EKG showing SR No cp or sob  HTN -controlled -cont current meds  HLD -recent labs reviewed, controlled -cont current meds  f/u 6 months or sooner as needed  50 min spent on complete encounter.       [EKG obtained to assist in diagnosis and management of assessed problem(s)] : EKG obtained to assist in diagnosis and management of assessed problem(s)

## 2024-10-28 ENCOUNTER — RX RENEWAL (OUTPATIENT)
Age: 59
End: 2024-10-28

## 2024-11-14 ENCOUNTER — RX RENEWAL (OUTPATIENT)
Age: 59
End: 2024-11-14

## 2024-12-07 ENCOUNTER — OFFICE (OUTPATIENT)
Dept: URBAN - METROPOLITAN AREA CLINIC 63 | Facility: CLINIC | Age: 59
Setting detail: OPHTHALMOLOGY
End: 2024-12-07
Payer: COMMERCIAL

## 2024-12-07 DIAGNOSIS — H25.813: ICD-10-CM

## 2024-12-07 DIAGNOSIS — E11.3213: ICD-10-CM

## 2024-12-07 PROBLEM — H43.811 POSTERIOR VITREOUS DETACHMENT; RIGHT EYE: Status: ACTIVE | Noted: 2024-12-07

## 2024-12-07 PROBLEM — E11.3212 DM TYPE 2; RIGHT MILD WITH ME, LEFT MILD WITH ME: Status: ACTIVE | Noted: 2024-12-07

## 2024-12-07 PROBLEM — E11.3211 DM TYPE 2; RIGHT MILD WITH ME, LEFT MILD WITH ME: Status: ACTIVE | Noted: 2024-12-07

## 2024-12-07 PROCEDURE — 99213 OFFICE O/P EST LOW 20 MIN: CPT | Performed by: INTERNAL MEDICINE

## 2024-12-07 ASSESSMENT — REFRACTION_CURRENTRX
OS_CYLINDER: -0.50
OD_OVR_VA: 20/
OS_SPHERE: -3.25
OD_AXIS: 000
OD_SPHERE: -4.00
OS_OVR_VA: 20/
OD_VPRISM_DIRECTION: SV
OD_CYLINDER: SPH
OS_VPRISM_DIRECTION: SV
OS_AXIS: 111

## 2024-12-07 ASSESSMENT — CONFRONTATIONAL VISUAL FIELD TEST (CVF)
OD_FINDINGS: FULL
OS_FINDINGS: FULL

## 2024-12-07 ASSESSMENT — REFRACTION_MANIFEST
OS_CYLINDER: -0.50
OD_VA1: 20/20
OS_VA1: 20/20
OD_CYLINDER: -0.50
OU_VA: 20/20
OS_AXIS: 125
OS_AXIS: 125
OS_SPHERE: -3.25
OD_CYLINDER: -0.50
OS_SPHERE: -3.25
OD_SPHERE: -3.25
OD_SPHERE: -3.25
OD_AXIS: 45
OU_VA: 20/20
OD_AXIS: 45
OS_VA1: 20/20
OS_CYLINDER: -0.50
OD_VA1: 20/20

## 2024-12-07 ASSESSMENT — KERATOMETRY
OS_AXISANGLE_DEGREES: 098
OD_AXISANGLE_DEGREES: 094
OD_K1POWER_DIOPTERS: 41.50
OD_K2POWER_DIOPTERS: 42.00
METHOD_AUTO_MANUAL: AUTO
OS_K1POWER_DIOPTERS: 42.75
OS_K2POWER_DIOPTERS: 43.75

## 2024-12-07 ASSESSMENT — REFRACTION_AUTOREFRACTION
OD_AXIS: 000
OD_CYLINDER: 0.00
OS_AXIS: 027
OS_SPHERE: +0.50
OD_SPHERE: +1.25
OS_CYLINDER: -0.25

## 2024-12-07 ASSESSMENT — VISUAL ACUITY
OD_BCVA: 20/25
OS_BCVA: 20/20

## 2024-12-07 ASSESSMENT — TONOMETRY
OS_IOP_MMHG: 15
OD_IOP_MMHG: 16

## 2024-12-27 ENCOUNTER — RX RENEWAL (OUTPATIENT)
Age: 59
End: 2024-12-27

## 2025-03-10 ENCOUNTER — OFFICE (OUTPATIENT)
Dept: URBAN - METROPOLITAN AREA CLINIC 63 | Facility: CLINIC | Age: 60
Setting detail: OPHTHALMOLOGY
End: 2025-03-10
Payer: COMMERCIAL

## 2025-03-10 DIAGNOSIS — H25.813: ICD-10-CM

## 2025-03-10 DIAGNOSIS — E11.3213: ICD-10-CM

## 2025-03-10 DIAGNOSIS — H43.811: ICD-10-CM

## 2025-03-10 PROBLEM — H02.011 TRICHIASIS; RIGHT UPPER LID: Status: ACTIVE | Noted: 2025-03-10

## 2025-03-10 PROCEDURE — 92014 COMPRE OPH EXAM EST PT 1/>: CPT | Performed by: INTERNAL MEDICINE

## 2025-03-10 PROCEDURE — 92134 CPTRZ OPH DX IMG PST SGM RTA: CPT | Performed by: INTERNAL MEDICINE

## 2025-03-10 ASSESSMENT — REFRACTION_CURRENTRX
OD_AXIS: 000
OS_VPRISM_DIRECTION: SV
OD_OVR_VA: 20/
OS_SPHERE: -3.25
OS_OVR_VA: 20/
OD_SPHERE: -4.00
OD_CYLINDER: SPH
OD_VPRISM_DIRECTION: SV
OS_CYLINDER: -0.50
OS_AXIS: 111

## 2025-03-10 ASSESSMENT — TONOMETRY
OD_IOP_MMHG: 16
OS_IOP_MMHG: 16

## 2025-03-10 ASSESSMENT — VISUAL ACUITY
OS_BCVA: 20/20
OD_BCVA: 20/25

## 2025-03-10 ASSESSMENT — REFRACTION_MANIFEST
OS_AXIS: 125
OD_VA1: 20/20
OU_VA: 20/20
OU_VA: 20/20
OD_SPHERE: -3.25
OS_SPHERE: -3.25
OS_CYLINDER: -0.50
OD_SPHERE: -3.25
OD_CYLINDER: -0.50
OS_VA1: 20/20
OS_SPHERE: -3.25
OD_AXIS: 45
OS_VA1: 20/20
OD_AXIS: 45
OS_AXIS: 125
OD_VA1: 20/20
OD_CYLINDER: -0.50
OS_CYLINDER: -0.50

## 2025-03-10 ASSESSMENT — LID EXAM ASSESSMENTS: OD_TRICHIASIS: RUL 1+

## 2025-03-10 ASSESSMENT — CONFRONTATIONAL VISUAL FIELD TEST (CVF)
OD_FINDINGS: FULL
OS_FINDINGS: FULL

## 2025-03-10 ASSESSMENT — REFRACTION_AUTOREFRACTION
OD_SPHERE: +1.25
OS_CYLINDER: -0.50
OS_SPHERE: +0.75
OD_AXIS: 092
OS_AXIS: 033
OD_CYLINDER: -0.50

## 2025-03-10 ASSESSMENT — KERATOMETRY
OS_K2POWER_DIOPTERS: 43.75
OD_K1POWER_DIOPTERS: 41.25
OS_AXISANGLE_DEGREES: 104
OD_AXISANGLE_DEGREES: 091
OD_K2POWER_DIOPTERS: 41.50
OS_K1POWER_DIOPTERS: 43.00
METHOD_AUTO_MANUAL: AUTO

## 2025-06-24 ENCOUNTER — RX RENEWAL (OUTPATIENT)
Age: 60
End: 2025-06-24

## 2025-07-16 ENCOUNTER — APPOINTMENT (OUTPATIENT)
Dept: ENDOCRINOLOGY | Facility: CLINIC | Age: 60
End: 2025-07-16
Payer: COMMERCIAL

## 2025-07-16 VITALS
BODY MASS INDEX: 40.52 KG/M2 | SYSTOLIC BLOOD PRESSURE: 132 MMHG | HEART RATE: 80 BPM | HEIGHT: 70 IN | DIASTOLIC BLOOD PRESSURE: 77 MMHG | WEIGHT: 283 LBS | OXYGEN SATURATION: 96 %

## 2025-07-16 PROCEDURE — G2211 COMPLEX E/M VISIT ADD ON: CPT | Mod: NC

## 2025-07-16 PROCEDURE — 99214 OFFICE O/P EST MOD 30 MIN: CPT

## 2025-08-12 ENCOUNTER — RX RENEWAL (OUTPATIENT)
Age: 60
End: 2025-08-12

## 2025-08-25 ENCOUNTER — RX RENEWAL (OUTPATIENT)
Age: 60
End: 2025-08-25

## 2025-08-27 ENCOUNTER — RX RENEWAL (OUTPATIENT)
Age: 60
End: 2025-08-27

## 2025-08-27 RX ORDER — TIRZEPATIDE 15 MG/.5ML
15 INJECTION, SOLUTION SUBCUTANEOUS
Qty: 6 | Refills: 3 | Status: ACTIVE | COMMUNITY
Start: 2025-08-27 | End: 1900-01-01